# Patient Record
Sex: MALE | URBAN - METROPOLITAN AREA
[De-identification: names, ages, dates, MRNs, and addresses within clinical notes are randomized per-mention and may not be internally consistent; named-entity substitution may affect disease eponyms.]

---

## 2017-08-03 ENCOUNTER — EMERGENCY (EMERGENCY)
Facility: HOSPITAL | Age: 27
LOS: 1 days | Discharge: HOME | End: 2017-08-03
Attending: EMERGENCY MEDICINE | Admitting: EMERGENCY MEDICINE
Payer: MEDICAID

## 2017-08-03 VITALS
OXYGEN SATURATION: 100 % | SYSTOLIC BLOOD PRESSURE: 123 MMHG | WEIGHT: 139.99 LBS | HEART RATE: 74 BPM | DIASTOLIC BLOOD PRESSURE: 79 MMHG | TEMPERATURE: 99 F | RESPIRATION RATE: 17 BRPM

## 2017-08-03 DIAGNOSIS — R07.89 OTHER CHEST PAIN: ICD-10-CM

## 2017-08-03 DIAGNOSIS — Z87.891 PERSONAL HISTORY OF NICOTINE DEPENDENCE: ICD-10-CM

## 2017-08-03 PROCEDURE — 93010 ELECTROCARDIOGRAM REPORT: CPT

## 2017-08-03 PROCEDURE — 99284 EMERGENCY DEPT VISIT MOD MDM: CPT | Mod: 25

## 2017-08-03 NOTE — ED PROVIDER NOTE - PROGRESS NOTE DETAILS
talking on phone without distress.  EKG without ischemic changes.  No CXR indicated - symptoms > 3 years, burning in nature, clear lungs, normal RR and no hypoxia.  Given follow up with cardiology.  Strict ED return instructions discussed.

## 2017-08-03 NOTE — ED PROVIDER NOTE - MEDICAL DECISION MAKING DETAILS
26y M with smoking hx and atypical intermittent cp for 3 years. No VS derangements. No loud murmurs. VS clear, ekg ordered. Has PMD f/u with whom he will f/u with. 26y M with smoking hx and atypical intermittent cp for 3 years. No VS derangements. No loud murmurs. VS clear, ekg ordered. Has PMD for follow up

## 2017-08-03 NOTE — ED PROVIDER NOTE - OBJECTIVE STATEMENT
26y M with no significant PMH presents with intermittent burning CP x 3-4 years. Pt is a smoker and admits to alcohol use. States he was advised to go to the ER by mother for concern of length of sx. No daily meds. Denies fever, n/v, sob, diaphoresis.